# Patient Record
Sex: FEMALE | Race: OTHER | Employment: UNEMPLOYED | URBAN - METROPOLITAN AREA
[De-identification: names, ages, dates, MRNs, and addresses within clinical notes are randomized per-mention and may not be internally consistent; named-entity substitution may affect disease eponyms.]

---
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
## 2021-01-01 ENCOUNTER — DOCUMENTATION ONLY (OUTPATIENT)
Dept: FAMILY MEDICINE CLINIC | Age: 21
End: 2021-01-01

## 2021-01-02 NOTE — PROGRESS NOTES
Patient is currently receiving her Albuterol 90mcg and Flovent 110mcg inhalers from the [facility]. Both medications are also available through PAP. On 13-, a [facility] PAP application in Irish was mailed to the patient's home. A postage paid, addressed envelope for her to return the application to me was included in the mailing.  Marija Norwood RN

## 2021-01-11 ENCOUNTER — TELEPHONE (OUTPATIENT)
Dept: FAMILY MEDICINE CLINIC | Age: 21
End: 2021-01-11

## 2021-01-12 ENCOUNTER — OFFICE VISIT (OUTPATIENT)
Dept: FAMILY MEDICINE CLINIC | Age: 21
End: 2021-01-12

## 2021-01-12 DIAGNOSIS — F43.23 ADJUSTMENT DISORDER WITH MIXED ANXIETY AND DEPRESSED MOOD: Primary | ICD-10-CM

## 2021-01-12 PROCEDURE — 90791 PSYCH DIAGNOSTIC EVALUATION: CPT | Performed by: SOCIAL WORKER

## 2021-01-12 NOTE — PROGRESS NOTES
This note will not be viewable in [facility] for the following reason(s). This is a Psychotherapy Note. ([address], [facility] [address] Providers Only)      INITIAL ASSESSMENT  Consent: Tara Melgar, who was seen by synchronous (real-time) audio-video technology, and/or patients healthcare decision maker, is aware that this patient-initiated, Telehealth encounter on 1-12-21 is a billable service, with coverage as determined by the insurance carrier. Patient is aware that he/she may receive a bill and has provided verbal consent to proceed. Pursuant to the emergency declaration under the [facility] waiver authority and the [employer] and [employer] Act, this Virtual Visit was conducted, with patient's (and/or legal guardian's) consent, to reduce the patient's risk of exposure to COVID-19 and provide necessary medical care. Services were provided through a video synchronous discussion virtually to substitute for in-person clinic visit. Patient and provider were located at their individual homes. Referred by mother, Alma Antwon, to this clinician for possible post-partum depression. Edd Kelly is 24years old with a three month old son, . The pregnancy and L&D were normal and the baby appeared to be healthy and developing typically. The father is minimally involved. Edd Kelly reports 'I'm always fighting with him.' Jayshree Royal with mom and baby; mom works. Edd Kelly is working towards her high school degree. She came to the [address] [place] [address],[address] from [facility] for approximately 4 years. Denies any trauma on the trip here. She reports feeling very tired, emotional and worried about money as her mother is working two jobs now to help support the baby. Her scores on questionnaires for anxiety, depression and psychosomatic complaints were below baseline for depression and psychosomatic symptoms and slightly elevated for anxiety.   She was frequently tearful in the session when talking about her aunt and uncle who raised her dying in [facility] three years ago and the effort it takes to care for her baby. In the questionnaires she indicated increased irritation, but was not positive for suicide or harming herself or the baby. When mom is home, she helps Briana Syed with the baby, as well. Briana Syed was positive when talking about having had a baby and did not express any regrets. She held the baby and fed the baby for most of the session and seemed bonded with him. She is not receiving any in-home services at this time. Briana Syed has minimal opportunity for leaving the house. She is afraid of taking the baby outside as she did so once and he got sick later. She reports not having motivation to go out for a walk. She does not have any friends now that school is virtual.  She has not community involvement. She denied any difficulty sleeping (baby is sleeping through the night for the most part) or with appetite. She denied any use of drugs or alcohol. She is on PRN medication for allergies and asthma. Clinician provided opportunity for emotional expression. Clinician facilitated exploration of how her life is different now with Sebas and began to explore how she feels about the changes. She was tearful for most of this discussion. Clinician explored some things she might do for self with and without baby. Clinician suggested she talk to pediatrician at next visit related to taking the baby outside. She agreed. Follow up session in 2 weeks virtual and in 4 weeks in person.

## 2021-01-28 ENCOUNTER — VIRTUAL VISIT (OUTPATIENT)
Dept: FAMILY MEDICINE CLINIC | Age: 21
End: 2021-01-28

## 2021-01-28 DIAGNOSIS — F43.23 ADJUSTMENT DISORDER WITH MIXED ANXIETY AND DEPRESSED MOOD: Primary | ICD-10-CM

## 2021-01-28 PROCEDURE — 90832 PSYTX W PT 30 MINUTES: CPT | Performed by: SOCIAL WORKER

## 2021-01-28 NOTE — PROGRESS NOTES
This note will not be viewable in [place] for the following reason(s). This is a Psychotherapy Note. ([address], [facility] [address] Providers Only)    Consent: Andreia Malcolm, who was seen by synchronous (real-time) audio-video technology, and/or patients healthcare decision maker, is aware that this patient-initiated, Telehealth encounter on 1-28-21 is a billable service, with coverage as determined by the insurance carrier. Patient is aware that he/she may receive a bill and has provided verbal consent to proceed. Pursuant to the emergency declaration under the [address] [place] [address], [facility] waiver authority and the [employer] and [employer] Act, this Virtual Visit was conducted, with patient's (and/or legal guardian's) consent, to reduce the patient's risk of exposure to COVID-19 and provide necessary medical care. Services were provided through a video synchronous discussion virtually to substitute for in-person clinic visit. Patient and provider were located at their individual homes. FOCUS: Follow up from initial session. Continue building trust and rapport. Clinician facilitated exploration into significant change in life plan she is experiencing and how she is feeling about having a baby and not living the 'typical' life of a 24year old. She acknowledges not having planned this for her life. Recognizes being isolated from others her age. Clinician explored resulting feelings. Clinician also processed how she is managing any frustration she feels with current lifestyle and with baby. Patient able to identify several healthy coping skills she uses to handle frustration and irritation. Baby is sleeping through the night for the most part, so she is not sleep deprived. Reports eating and sleeping well. Still working on school work.  Clinician provided psycho-education on self care and how this relates to positive maternal involvement with baby.  Clinician encouraged her to read to baby and to ask pediatrician for guides as to how to support baby's overall development. ASSESSMENT:  Clinician observed patient as having good coping skills and healthy focus on baby and on self. Has started going out more with the baby and able to verbalize feeling better because of this. Clinician encouraged her to do this as much as possible, with and without baby. Clinician observed more relaxed affect in patient and she agreed that maintaining therapeutic relationship will help her manage stressors that may come later. PLAN: Follow up in two weeks in clinic. Patient is to come to that visit with an idea of what she would like for therapeutic plan for next three months.

## 2021-01-29 ENCOUNTER — CLINICAL SUPPORT (OUTPATIENT)
Dept: OBGYN CLINIC | Age: 21
End: 2021-01-29

## 2021-01-29 VITALS
HEART RATE: 86 BPM | BODY MASS INDEX: 24.51 KG/M2 | SYSTOLIC BLOOD PRESSURE: 124 MMHG | WEIGHT: 129.8 LBS | HEIGHT: 61 IN | DIASTOLIC BLOOD PRESSURE: 58 MMHG

## 2021-01-29 DIAGNOSIS — Z30.42 ENCOUNTER FOR MANAGEMENT AND INJECTION OF DEPO-PROVERA: Primary | ICD-10-CM

## 2021-01-29 PROCEDURE — 96372 THER/PROPH/DIAG INJ SC/IM: CPT | Performed by: OBSTETRICS & GYNECOLOGY

## 2021-01-29 RX ORDER — MEDROXYPROGESTERONE ACETATE 150 MG/ML
150 INJECTION, SUSPENSION INTRAMUSCULAR ONCE
Status: COMPLETED | OUTPATIENT
Start: 2021-01-29 | End: 2021-01-29

## 2021-01-29 RX ADMIN — MEDROXYPROGESTERONE ACETATE 150 MG: 150 INJECTION, SUSPENSION INTRAMUSCULAR at 15:35

## 2021-01-29 NOTE — PROGRESS NOTES
Last Depo-Provera injection: 11/13/2020  Side Effects if any: none  Serum HCG indicated? Depo-Provera 150 mg IM given by: Estephanie Azar in gluteus ( right). Next Depo-Provera injection due: 4/16-4/30/21    Administered 150mg/mL Depo-Provera per orders of Dat Bustillos MD . Verbal consent received by Ms. Darryn Page & injection given. Patient tolerated well, no complications and no side effects. Encouraged her to remain in clinic for 15 minutes and immediately report any adverse reactions. Patient informed of next injection date ranges and encouraged to schedule. She verbalized understanding and expressed intent to comply.

## 2021-02-09 ENCOUNTER — OFFICE VISIT (OUTPATIENT)
Dept: FAMILY MEDICINE CLINIC | Age: 21
End: 2021-02-09

## 2021-02-09 DIAGNOSIS — F43.23 ADJUSTMENT DISORDER WITH MIXED ANXIETY AND DEPRESSED MOOD: Primary | ICD-10-CM

## 2021-02-09 DIAGNOSIS — Z63.8 FAMILY DISCORD: ICD-10-CM

## 2021-02-09 PROCEDURE — 90837 PSYTX W PT 60 MINUTES: CPT | Performed by: SOCIAL WORKER

## 2021-02-09 SDOH — SOCIAL STABILITY - SOCIAL INSECURITY: OTHER SPECIFIED PROBLEMS RELATED TO PRIMARY SUPPORT GROUP: Z63.8

## 2021-02-09 NOTE — PROGRESS NOTES
Chilo Parker presented in session as alert and oriented and well-groomed  She was particularly tearful when talking about how angry her mother has been lately seemingly with everything Chilo Parker does. Chilo Parker reports that mom gives conflicting information. Client's affect was sad and overwhelmed. Clinician provided opportunity for emotional expression. Clinician also explored and processed with patient how she is absorbing and taking responsibility for a lot of mom's feelings. Chilo Parker recognizes that mom seems mad at her for ruining her life by getting pregnant. Clinician processed how Chilo Parker feels about getting pregnant and having a baby at 24. Chilo Parker denies feeling embarrassed or that she did something wrong. She recognizes how she has altered her life, but continues to verbalize a focus on graduating high school and getting a good job. Chilo Parker also reported that she and the father of her baby have reconciled and that he is now living with them in her mother's house. He is working and helping with the baby and some of the bills. Chilo Parker may look for a job, as well, because her mother is accusing her of not helping with the bills. Mom has also strongly encouraged Chilo Parker to make her education her priority. She takes classes at night, so could work during the day. However, her mother also said that if she gets a job, she will charge her for watching the baby. Clinician provided psycho-education on how to maintain boundaries with other's emotions and to be aware of the level of her anger to as to manage it sooner before losing her temper. We will have a virtual appointment in two weeks.

## 2021-02-23 ENCOUNTER — OFFICE VISIT (OUTPATIENT)
Dept: FAMILY MEDICINE CLINIC | Age: 21
End: 2021-02-23

## 2021-02-23 DIAGNOSIS — F43.23 ADJUSTMENT DISORDER WITH MIXED ANXIETY AND DEPRESSED MOOD: Primary | ICD-10-CM

## 2021-02-23 DIAGNOSIS — Z63.8 FAMILY DISCORD: ICD-10-CM

## 2021-02-23 PROCEDURE — 90832 PSYTX W PT 30 MINUTES: CPT | Performed by: SOCIAL WORKER

## 2021-02-23 SDOH — SOCIAL STABILITY - SOCIAL INSECURITY: OTHER SPECIFIED PROBLEMS RELATED TO PRIMARY SUPPORT GROUP: Z63.8

## 2021-02-23 NOTE — PROGRESS NOTES
Came in for F2F appointment at [address] [employer]. Affect and mood congruent and appeared much improved from last session. Abi Haskins reports that relationships with mom and between mom and the father of her baby, who is living with them, have improved as a result of mom and boyfriend talking about finances. Abi Haskins reports decrease in symptoms that brought her into counseling in first place, but she is also willing to continue. She continues to attend classes. Mom has changed her mind about her having to work and is again supportive of her graduating in June. This clinician facilitated exploration with Abi Haskins about ways of managing other people's anger and emotions and how to prevent their emotion to change her mood as much as it did in last session. Clinician provided psycho-education on how mom's emotional state can affect the baby. Edith expressed understanding and agreement. Clinician also discussed normal stages of development for baby and encouraged Abi Haskins to allow her baby to play on the floor in order to develop muscles he will need to crawl and then walk. She denied any issues with baby and has established a routine with him of sleep, feeding, and playing. Abi Haskins was in agreement that to continue sessions was important, but she did not feel she needed to continue to come in every week. A follow up F2F appointment was made for 5 weeks from today and she was encouraged to call in between now and then if things worsened. She agreed.

## 2021-04-06 ENCOUNTER — OFFICE VISIT (OUTPATIENT)
Dept: FAMILY MEDICINE CLINIC | Age: 21
End: 2021-04-06

## 2021-04-06 DIAGNOSIS — F43.23 ADJUSTMENT DISORDER WITH MIXED ANXIETY AND DEPRESSED MOOD: Primary | ICD-10-CM

## 2021-04-06 PROCEDURE — 90832 PSYTX W PT 30 MINUTES: CPT | Performed by: SOCIAL WORKER

## 2021-04-06 NOTE — PROGRESS NOTES
Seen in clinic. Continuing to do well. No issues with mom or with father of her son who continues to live with them. Baby developing normally. Is 7 months old and weights 24 pounds. Is starting to pull up and to say some words. Edith's affect bright when talking about him. She continues in school and will graduate in June. In session she makes eye contact and is engaged. Reports health of everyone good and no concerns with food or housing. Mom and boyfriend continue to work full time to allow her to focus on baby and school. Denies any current emotional issues. She requested follow up session for three months out and agreed to call if things continue to improve.

## 2021-04-20 ENCOUNTER — OFFICE VISIT (OUTPATIENT)
Dept: OBGYN CLINIC | Age: 21
End: 2021-04-20

## 2021-04-20 DIAGNOSIS — N76.0 VAGINITIS AND VULVOVAGINITIS: Primary | ICD-10-CM

## 2021-04-20 DIAGNOSIS — Z30.42 ENCOUNTER FOR DEPO-PROVERA CONTRACEPTION: ICD-10-CM

## 2021-04-20 PROCEDURE — 96372 THER/PROPH/DIAG INJ SC/IM: CPT | Performed by: OBSTETRICS & GYNECOLOGY

## 2021-04-20 PROCEDURE — 99213 OFFICE O/P EST LOW 20 MIN: CPT | Performed by: OBSTETRICS & GYNECOLOGY

## 2021-04-20 RX ORDER — MEDROXYPROGESTERONE ACETATE 150 MG/ML
150 INJECTION, SUSPENSION INTRAMUSCULAR ONCE
Status: COMPLETED | OUTPATIENT
Start: 2021-04-20 | End: 2021-04-20

## 2021-04-20 RX ADMIN — MEDROXYPROGESTERONE ACETATE 150 MG: 150 INJECTION, SUSPENSION INTRAMUSCULAR at 11:23

## 2021-04-20 NOTE — PROGRESS NOTES
Date last pap: NA due to age [de-identified] AE 11/11/2020  Last Depo-Provera: 1/29/21  Side Effects if any: none  Serum HCG indicated? Not indicated  Depo-Provera 150 mg IM given by: Denisse Kim  Next appointment due 7/6/21-7/20/21    Administered 150mg/mL Depo-Provera per MD order. Verbal consent given by patient. Injection given IM in the right gluteus . Patient tolerated well, no complications, no side effects. Lot # [id number]  Exp: 9/2022    Advised patient dates for next depo injection.  Patient verbalized understanding

## 2021-04-23 LAB
A VAGINAE DNA VAG QL NAA+PROBE: ABNORMAL SCORE
BVAB2 DNA VAG QL NAA+PROBE: ABNORMAL SCORE
C ALBICANS DNA VAG QL NAA+PROBE: NEGATIVE
C GLABRATA DNA VAG QL NAA+PROBE: NEGATIVE
C TRACH DNA VAG QL NAA+PROBE: POSITIVE
MEGA1 DNA VAG QL NAA+PROBE: ABNORMAL SCORE
N GONORRHOEA DNA VAG QL NAA+PROBE: NEGATIVE
T VAGINALIS DNA VAG QL NAA+PROBE: NEGATIVE

## 2021-04-23 NOTE — PROGRESS NOTES
Notify BV and trich--Rx Flagyl 500 bid for 7 days, one refill  Don't have sex with partner until he is treated also.

## 2021-04-27 RX ORDER — METRONIDAZOLE 500 MG/1
500 TABLET ORAL 2 TIMES DAILY
Qty: 14 TAB | Refills: 1 | Status: SHIPPED | OUTPATIENT
Start: 2021-04-27 | End: 2021-05-04

## 2021-04-27 RX ORDER — AZITHROMYCIN 500 MG/1
1000 TABLET, FILM COATED ORAL
Qty: 2 TAB | Refills: 1 | Status: SHIPPED | OUTPATIENT
Start: 2021-04-27

## 2021-04-27 NOTE — PROGRESS NOTES
used #[id number] to contact patient. RX sent for Flagyl 500mg bid for 7 days and Zithromax 1g at delivery    Left voice message for pt to call back.

## 2021-06-19 NOTE — L&D DELIVERY NOTE
Delivery Summary    Patient: Jean Carlos Nelson MRN: [id number]  SSN: [id number]    YOB: 2000  Age: 24 y.o. Sex: female       Information for the patient's :  Kike Zhou [[id number]]       Labor Events:    Labor: No    Steroids: None   Cervical Ripening Date/Time: 2020     Cervical Ripening Type: Langston/EASI   Antibiotics During Labor:     Rupture Identifier:      Rupture Date/Time: 2020 6:27 AM   Rupture Type: SROM   Amniotic Fluid Volume: Moderate    Amniotic Fluid Description: Meconium    Amniotic Fluid Odor: None    Induction: Langston Bulb (balloon)       Induction Date/Time: 2020      Indications for Induction: Elective    Augmentation: Oxytocin   Augmentation Date/Time:      Indications for Augmentation: Chorioamnionitis   Labor complications: Chorioamnionitis       Additional complications:        Delivery Events:  Indications For Episiotomy:     Episiotomy:     Perineal Laceration(s):     Repaired:     Periurethral Laceration Location:      Repaired:     Labial Laceration Location:     Repaired:     Sulcal Laceration Location: right   Repaired: Yes   Vaginal Laceration Location: left   Repaired: Yes   Cervical Laceration Location:     Repaired:     Repair Suture: Chromic 2-0   Number of Repair Packets: 1   Estimated Blood Loss (ml):  see QBL   Quantitative Blood Loss (ml)                Delivery Date: 2020    Delivery Time: 4:59 PM  Delivery Type: Vaginal, Spontaneous  Sex:  Male    Gestational Age: 41w4d   Delivery Clinician:  Tiff Lombardi  Living Status: Living   Delivery Location: L&D           APGARS  One minute Five minutes Ten minutes   Skin color: 1   1        Heart rate: 2   2        Grimace: 2   2        Muscle tone: 2   2        Breathin   2        Totals: 9   9            Presentation: Vertex    Position: Left Occiput Anterior  Resuscitation Method:  Suctioning-bulb; Tactile Stimulation     Meconium Stained:  Thin Cord Information: 3 Vessels  Complications: None  Cord around:    Delayed cord clamping? Yes  Cord clamped date/time:2020  5:00 PM  Disposition of Cord Blood: Lab    Blood Gases Sent?: No    Placenta:  Date/Time: 2020  5:02 PM  Removal: Expressed      Appearance: Normal;Intact//Intact/Uterus explored and negative      Measurements:  Birth Weight:        Birth Length:        Head Circumference:        Chest Circumference:       Abdominal Girth: Other Providers:   NERY Almeida;Trent SAAB, Obstetrician;Primary Nurse;Primary  Nurse           Group B Strep:   Lab Results   Component Value Date/Time    GrBStrep, External Negative 2020     Information for the patient's :  Daphne Suarez, Male Gurwinderhussain Menendezceci [[id number]]   No results found for: ABORH, PCTABR, PCTDIG, BILI, ABORHEXT, ABORH     No results for input(s): PCO2CB, PO2CB, HCO3I, SO2I, IBD, PTEMPI, SPECTI, PHICB, ISITE, IDEV, IALLEN in the last 72 hours. 0

## 2021-07-20 ENCOUNTER — OFFICE VISIT (OUTPATIENT)
Dept: FAMILY MEDICINE CLINIC | Age: 21
End: 2021-07-20

## 2021-07-20 DIAGNOSIS — F33.1 MAJOR DEPRESSIVE DISORDER, RECURRENT EPISODE, MODERATE (HCC): Primary | ICD-10-CM

## 2021-07-20 DIAGNOSIS — Z63.8 FAMILY DISCORD: ICD-10-CM

## 2021-07-20 PROCEDURE — 90837 PSYTX W PT 60 MINUTES: CPT | Performed by: SOCIAL WORKER

## 2021-07-20 SDOH — SOCIAL STABILITY - SOCIAL INSECURITY: OTHER SPECIFIED PROBLEMS RELATED TO PRIMARY SUPPORT GROUP: Z63.8

## 2021-07-22 NOTE — PROGRESS NOTES
Psychotherapy Progress Note  Functional Status/MSWE: WNL  Safety Concerns: none at this time  Psych Meds: See chart  Diagnosis: See chart  Necessity of treatment due to:   ADHD Symptoms  Psychiatric Meds   X Anxiety  OCD Symptoms    Appetite X Regression Risk    Cognitive Impairment X Sleep   X Depression  Social    Harm (to others)  Substance Abuse    Harm (to self)  Thought Disorder    Medical: X Other: Family discord   Modalities Used:  X Cognitive Challenging X Exploration of Relationship Patterns  Psychoeducation   X Cognitive Refocusing X Facilitate Insight X Relaxation Techniques    Cognitive Reframing  EMDR  Review of Tx Plan/Progress    Crisis Intervention X Grief Processing  Reflect Patterns and Defenses   X Communication Skills  Guided Imagery (GIM-[place] Method)    Role Play/behavioral Rehearsal    DBT  Interactive Feedback   Structured Problem Solving   X Explore Behavior  Interpersonal Resolutions  Supportive Reflection   X Explore Feelings/Issues  Instilling Hope  Symptom Management   X Explore Negative Self Talk  Mindfulness Training  Other:   X Exploration of Coping Patterns X Provide Opportunity for Emotional Expression     Summary of Session: Migue Schulte reports feeling overwhelmed due to vitriolic nature of her interactions with her mother, with whom she and boyfriend and [de-identified] old baby live. Migue Schulte expresses feeling pulled between boyfriend and mother and she relies on both of them significantly. She is currently looking for a job, but reports that mother has been ambivalent about caring for baby if they decide they are moving out. Migue Schulte recognizes that moving out is the ideal solution, but would need mom to babysit and mom is ambivalent in her support at this time. Progress/Clinical Assessment:  This clinician also sees patient's mother from time to time and there are significant differences in perceptions of mother and daughter as to the behavior of the other and differences in how they are reporting information. Daughter and mother both have demonstrated hesitation in coming in for a family session. Per Edith's report, mom withholds potential of babysitting the grandchild if daughter moves out with boyfriend. Daughter feels that mom is manipulating her into continuing to live with her and help with expenses. Daughter is also aware that mom is angry at her for getting pregnant and angry at boyfriend. Mom often attempts to disrupt their relationship by accusing boyfriend of using drugs, not finding a job, not paying rent, etc. Mom also listens to their disagrements and then uses the information against Edith and against the boyfriend. Treatment Goals Addressed this Session: feelings of depression and anxiety; maintaining health and safety of baby primary; improving communication with mom, but also knowing when to walk away. Plan/ Frequency of Treatment: Sessions every three weeks; will continue to address possible solutions to having family sessions.

## 2021-08-24 ENCOUNTER — DOCUMENTATION ONLY (OUTPATIENT)
Dept: FAMILY MEDICINE CLINIC | Age: 21
End: 2021-08-24

## 2021-08-24 NOTE — PROGRESS NOTES
No show. However, site was changed, so she may not have gotten the voice mail left for her. She will be offered an opportunity to re-schedule.

## 2021-09-08 ENCOUNTER — TELEPHONE (OUTPATIENT)
Dept: FAMILY MEDICINE CLINIC | Age: 21
End: 2021-09-08

## 2021-09-08 NOTE — TELEPHONE ENCOUNTER
P/C to Ambrosio Huynh to reschedule the no show behavioral health appointment from 08.24.21. The appointment was rescheduled for 09.14.21 at 2:00PM. I reminded the patient of the importance to contact the office at [phone] if she needs to cancel/reschedule the appointment. Patient verbalized understanding and has no further changes.     P/C routed to [facility], [place] [address] Kev Harrell

## 2021-09-14 ENCOUNTER — DOCUMENTATION ONLY (OUTPATIENT)
Dept: FAMILY MEDICINE CLINIC | Age: 21
End: 2021-09-14

## 2021-09-14 NOTE — PROGRESS NOTES
2nd no show. Did confirm appointment. Will not be rescheduled.  If she calls to make appointment, she can be placed on waiting list.

## 2022-03-19 PROBLEM — Z34.81 ENCOUNTER FOR SUPERVISION OF OTHER NORMAL PREGNANCY, FIRST TRIMESTER: Status: ACTIVE | Noted: 2020-09-16

## 2022-03-19 PROBLEM — Z34.01 SUPERVISION OF NORMAL FIRST TEEN PREGNANCY, FIRST TRIMESTER: Status: ACTIVE | Noted: 2020-02-06

## 2022-03-19 PROBLEM — Z34.90 PREGNANCY: Status: ACTIVE | Noted: 2020-09-17

## 2023-01-17 ENCOUNTER — HOSPITAL ENCOUNTER (OUTPATIENT)
Dept: LAB | Age: 23
Discharge: HOME | End: 2023-01-17

## 2023-01-17 ENCOUNTER — OFFICE VISIT (OUTPATIENT)
Dept: FAMILY MEDICINE CLINIC | Age: 23
End: 2023-01-17

## 2023-01-17 VITALS
TEMPERATURE: 98.1 F | OXYGEN SATURATION: 100 % | DIASTOLIC BLOOD PRESSURE: 70 MMHG | WEIGHT: 158 LBS | HEART RATE: 81 BPM | SYSTOLIC BLOOD PRESSURE: 119 MMHG | BODY MASS INDEX: 29.85 KG/M2

## 2023-01-17 DIAGNOSIS — Z01.419 ENCOUNTER FOR WELL WOMAN EXAM: ICD-10-CM

## 2023-01-17 DIAGNOSIS — Z23 ENCOUNTER FOR IMMUNIZATION: ICD-10-CM

## 2023-01-17 DIAGNOSIS — Z11.3 ROUTINE SCREENING FOR STI (SEXUALLY TRANSMITTED INFECTION): ICD-10-CM

## 2023-01-17 DIAGNOSIS — Z01.419 ENCOUNTER FOR WELL WOMAN EXAM: Primary | ICD-10-CM

## 2023-01-17 PROCEDURE — 88142 CYTOPATH C/V THIN LAYER: CPT

## 2023-01-17 PROCEDURE — 99203 OFFICE O/P NEW LOW 30 MIN: CPT | Performed by: PHYSICIAN ASSISTANT

## 2023-01-17 PROCEDURE — 90686 IIV4 VACC NO PRSV 0.5 ML IM: CPT

## 2023-01-17 PROCEDURE — 90471 IMMUNIZATION ADMIN: CPT

## 2023-01-17 PROCEDURE — 87491 CHLMYD TRACH DNA AMP PROBE: CPT

## 2023-01-17 NOTE — PROGRESS NOTES
Name and  verified. Flu vaccine administered per patient request after obtaining consent, confirming VIS understanding, and ruling out contraindications. Patient instructed about signs of allergic reactions and when to seek emergency care. Patient verbalized understanding. Tolerated vaccination well, no adverse effects noted. Recorded in EMR and VIIS. Patient instructed to stay in waiting area for 15 minute for observation. Patient discharged in stable condition after 15 minute waiting period. Patient given an opportunity to verbalize questions/concerns. No questions at this time. [facility] interpretor #[id number] assisted.

## 2023-01-17 NOTE — PROGRESS NOTES
Maya Oreilly seen at d/c, full name and  verified. After Visit Summary provided and reviewed along with discharge instructions. Provided patient with family planning resources. Opportunity for questions provided, patient verbalizes understanding.

## 2023-01-17 NOTE — PROGRESS NOTES
Assessment/Plan:    Diagnoses and all orders for this visit:    1. Encounter for well woman exam  -     PAP, LIQUID BASED, MANUAL SCREEN; Future    2. Routine screening for STI (sexually transmitted infection)  -     CT/NG/T.VAGINALIS AMPLIFICATION; Future        Follow-up and Dispositions    Return in about 1 year (around 2024), or if symptoms worsen or fail to improve. JOSEFINA Renteria expressed understanding of this plan. An AVS was printed and given to the patient.      ----------------------------------------------------------------------    Chief Complaint   Patient presents with    Well Woman       History of Present Illness:    New pt 24 yo presents for well woman exam and STI screening   4 yo   She is on monthly depo through [facility]   She is in a relationship with the FOB, this has been off/on. She would like STI testing bc they just recently reunited  She denies risk of DV  She is not having any pain with intercourse or any vaginal discharge  She is interested in getting referred for family planning     Past Medical History:   Diagnosis Date    Asthma     Chlamydia 21, 2020    Eczema     History of chicken pox     at age 3    HPV vaccine counseling     3/3 Gardasil received       Current Outpatient Medications   Medication Sig Dispense Refill    azithromycin (ZITHROMAX) 500 mg tab Take 2 Tabs by mouth Once at Delivery for 1 dose. 2 Tab 1    medroxyPROGESTERone (DEPO-PROVERA) 150 mg/mL syrg INJECT 1ML INTO THE MUSCLE ONCE FOR A ONE TIME DOSE      albuterol (PROVENTIL HFA, VENTOLIN HFA, PROAIR HFA) 90 mcg/actuation inhaler Take 2 Puffs by inhalation every four (4) hours as needed for Wheezing or Shortness of Breath. 2 Inhaler 3    fluticasone propionate (FLOVENT HFA) 110 mcg/actuation inhaler Take 2 Puffs by inhalation every twelve (12) hours.  2 Inhaler 6    triamcinolone acetonide (KENALOG) 0.1 % topical cream Apply  to affected area two (2) times a day. use thin layer legs x 3 weeks 454 g 2    loratadine (CLARITIN) 10 mg tablet Take 1 Tab by mouth daily. 90 Tab 3       No Known Allergies    Social History     Tobacco Use    Smoking status: Never    Smokeless tobacco: Never   Substance Use Topics    Alcohol use: No    Drug use: No       No family history on file. Physical Exam:     Visit Vitals  /70   Pulse 81   Temp 98.1 °F (36.7 °C) (Temporal)   Wt 158 lb (71.7 kg)   LMP 12/28/2022   SpO2 100%   BMI 29.85 kg/m²       A&Ox3  WDWN NAD  Respirations normal and non labored  Pelvic exam- ext neg for lesion or discharge. Cervix and vagina w/out lesion or discharge.  Uterus and adnexal exam neg for mass or tenderness

## 2023-01-17 NOTE — PROGRESS NOTES
[facility]    When was your last pap smear and where? 2020    Any abnormal results from previous pap smears? no    Any problems with your breasts? no    Any family history of breast/ovarian cancer? no    Do you have any kids? yes    Oldest  youngest 3years old    Are you sexually active? si    Are you using any type of birth control? If yes where do you go for this? [facility]    Abuse screening completed this visit?  yes

## 2023-01-19 NOTE — PROGRESS NOTES
Please send letter: Abdirahman Mesa results of your pap test are neg/normal. Your next pap will be in 3 years\".  Thank you

## 2023-01-20 LAB
C TRACH RRNA SPEC QL NAA+PROBE: NEGATIVE
N GONORRHOEA RRNA SPEC QL NAA+PROBE: NEGATIVE
SPECIMEN SOURCE: NORMAL

## 2023-01-20 NOTE — PROGRESS NOTES
A letter with provider comments/recommendations, \"the results of your pap test are neg/normal. Your next pap will be in 3 years,\" was drafted and sent to queue.

## 2023-05-22 RX ORDER — TRIAMCINOLONE ACETONIDE 1 MG/G
CREAM TOPICAL 2 TIMES DAILY
COMMUNITY
Start: 2019-10-24

## 2023-05-22 RX ORDER — MEDROXYPROGESTERONE ACETATE 150 MG/ML
INJECTION, SUSPENSION INTRAMUSCULAR
COMMUNITY
Start: 2020-11-12

## 2023-05-22 RX ORDER — AZITHROMYCIN 500 MG/1
1000 TABLET, FILM COATED ORAL
COMMUNITY
Start: 2021-04-27

## 2023-05-22 RX ORDER — FLUTICASONE PROPIONATE 110 UG/1
2 AEROSOL, METERED RESPIRATORY (INHALATION) EVERY 12 HOURS
COMMUNITY
Start: 2020-04-02

## 2023-05-22 RX ORDER — LORATADINE 10 MG/1
10 TABLET ORAL DAILY
COMMUNITY
Start: 2016-09-22

## 2023-05-22 RX ORDER — ALBUTEROL SULFATE 90 UG/1
2 AEROSOL, METERED RESPIRATORY (INHALATION) EVERY 4 HOURS PRN
COMMUNITY
Start: 2020-04-02

## 2023-11-10 NOTE — TELEPHONE ENCOUNTER
Nurse telephoned patient to let her know that documents were faxed to [facility] pharmacy and that she would receive a phone call message from [facility] when her medication was ready to . If patient does not receive phone call in 5 business days of today 3/1/19 patient to go in person to the [facility] [address] [place] [id number] and ask for the medication at the medication window. If she encounter a problem after the 5 business days she needs to call our office to let us know. -send message to Cindy Marlow RN      If patient or parent/legal guardian call please provide the above information thanks. n/a

## 2024-09-15 ENCOUNTER — APPOINTMENT (OUTPATIENT)
Facility: HOSPITAL | Age: 24
End: 2024-09-15
Payer: COMMERCIAL

## 2024-09-15 ENCOUNTER — HOSPITAL ENCOUNTER (EMERGENCY)
Facility: HOSPITAL | Age: 24
Discharge: HOME | End: 2024-09-15
Attending: EMERGENCY MEDICINE
Payer: COMMERCIAL

## 2024-09-15 VITALS
SYSTOLIC BLOOD PRESSURE: 133 MMHG | OXYGEN SATURATION: 100 % | TEMPERATURE: 98.2 F | WEIGHT: 160.05 LBS | RESPIRATION RATE: 18 BRPM | HEART RATE: 85 BPM | DIASTOLIC BLOOD PRESSURE: 78 MMHG

## 2024-09-15 DIAGNOSIS — S99.922A INJURY OF TOE ON LEFT FOOT, INITIAL ENCOUNTER: Primary | ICD-10-CM

## 2024-09-15 DIAGNOSIS — S91.215A LACERATION OF LESSER TOE OF LEFT FOOT WITHOUT FOREIGN BODY WITH DAMAGE TO NAIL, INITIAL ENCOUNTER: ICD-10-CM

## 2024-09-15 PROCEDURE — 90714 TD VACC NO PRESV 7 YRS+ IM: CPT

## 2024-09-15 PROCEDURE — 6360000002 HC RX W HCPCS

## 2024-09-15 PROCEDURE — 2500000003 HC RX 250 WO HCPCS

## 2024-09-15 PROCEDURE — 6370000000 HC RX 637 (ALT 250 FOR IP)

## 2024-09-15 PROCEDURE — 99284 EMERGENCY DEPT VISIT MOD MDM: CPT

## 2024-09-15 PROCEDURE — 90471 IMMUNIZATION ADMIN: CPT

## 2024-09-15 PROCEDURE — 73660 X-RAY EXAM OF TOE(S): CPT

## 2024-09-15 RX ORDER — IBUPROFEN 400 MG/1
600 TABLET, FILM COATED ORAL
Status: COMPLETED | OUTPATIENT
Start: 2024-09-15 | End: 2024-09-15

## 2024-09-15 RX ORDER — GINSENG 100 MG
CAPSULE ORAL
Qty: 14.2 G | Refills: 0 | Status: SHIPPED | OUTPATIENT
Start: 2024-09-15

## 2024-09-15 RX ORDER — GINSENG 100 MG
CAPSULE ORAL
Status: COMPLETED | OUTPATIENT
Start: 2024-09-15 | End: 2024-09-15

## 2024-09-15 RX ORDER — LIDOCAINE HYDROCHLORIDE 10 MG/ML
5 INJECTION, SOLUTION EPIDURAL; INFILTRATION; INTRACAUDAL; PERINEURAL
Status: COMPLETED | OUTPATIENT
Start: 2024-09-15 | End: 2024-09-15

## 2024-09-15 RX ORDER — CEPHALEXIN 500 MG/1
500 CAPSULE ORAL 3 TIMES DAILY
Qty: 21 CAPSULE | Refills: 0 | Status: SHIPPED | OUTPATIENT
Start: 2024-09-15 | End: 2024-09-22

## 2024-09-15 RX ADMIN — CLOSTRIDIUM TETANI TOXOID ANTIGEN (FORMALDEHYDE INACTIVATED) AND CORYNEBACTERIUM DIPHTHERIAE TOXOID ANTIGEN (FORMALDEHYDE INACTIVATED) 0.5 ML: 5; 2 INJECTION, SUSPENSION INTRAMUSCULAR at 19:29

## 2024-09-15 RX ADMIN — IBUPROFEN 600 MG: 400 TABLET, FILM COATED ORAL at 19:09

## 2024-09-15 RX ADMIN — LIDOCAINE HYDROCHLORIDE 5 ML: 10 INJECTION, SOLUTION EPIDURAL; INFILTRATION; INTRACAUDAL; PERINEURAL at 20:18

## 2024-09-15 RX ADMIN — BACITRACIN: 500 OINTMENT TOPICAL at 20:17

## 2024-09-15 ASSESSMENT — PAIN - FUNCTIONAL ASSESSMENT
PAIN_FUNCTIONAL_ASSESSMENT: ACTIVITIES ARE NOT PREVENTED
PAIN_FUNCTIONAL_ASSESSMENT: 0-10

## 2024-09-15 ASSESSMENT — PAIN DESCRIPTION - LOCATION: LOCATION: TOE (COMMENT WHICH ONE)

## 2024-09-15 ASSESSMENT — PAIN DESCRIPTION - ORIENTATION: ORIENTATION: LEFT

## 2024-09-15 ASSESSMENT — PAIN SCALES - GENERAL: PAINLEVEL_OUTOF10: 8

## 2024-09-15 ASSESSMENT — PAIN DESCRIPTION - DESCRIPTORS: DESCRIPTORS: ACHING

## 2024-09-15 ASSESSMENT — PAIN DESCRIPTION - FREQUENCY: FREQUENCY: CONTINUOUS

## 2024-09-15 ASSESSMENT — PAIN DESCRIPTION - PAIN TYPE: TYPE: ACUTE PAIN

## 2024-09-15 ASSESSMENT — PAIN DESCRIPTION - ONSET: ONSET: ON-GOING

## 2024-09-24 ENCOUNTER — APPOINTMENT (OUTPATIENT)
Facility: HOSPITAL | Age: 24
End: 2024-09-24
Payer: COMMERCIAL

## 2024-09-24 ENCOUNTER — HOSPITAL ENCOUNTER (EMERGENCY)
Facility: HOSPITAL | Age: 24
Discharge: HOME | End: 2024-09-24
Attending: EMERGENCY MEDICINE
Payer: COMMERCIAL

## 2024-09-24 VITALS
OXYGEN SATURATION: 99 % | DIASTOLIC BLOOD PRESSURE: 80 MMHG | RESPIRATION RATE: 16 BRPM | HEART RATE: 66 BPM | SYSTOLIC BLOOD PRESSURE: 125 MMHG | BODY MASS INDEX: 27.58 KG/M2 | WEIGHT: 165.57 LBS | TEMPERATURE: 98.6 F | HEIGHT: 65 IN

## 2024-09-24 DIAGNOSIS — N93.8 DYSFUNCTIONAL UTERINE BLEEDING: ICD-10-CM

## 2024-09-24 DIAGNOSIS — N93.9 VAGINAL BLEEDING: Primary | ICD-10-CM

## 2024-09-24 LAB
ALBUMIN SERPL-MCNC: 3.7 G/DL (ref 3.5–5)
ALBUMIN/GLOB SERPL: 1.1 (ref 1.1–2.2)
ALP SERPL-CCNC: 82 U/L (ref 45–117)
ALT SERPL-CCNC: 42 U/L (ref 12–78)
AMORPH CRY URNS QL MICRO: ABNORMAL
ANION GAP SERPL CALC-SCNC: 5 MMOL/L (ref 2–12)
APPEARANCE UR: ABNORMAL
AST SERPL-CCNC: 24 U/L (ref 15–37)
BACTERIA URNS QL MICRO: ABNORMAL /HPF
BASOPHILS # BLD: 0 K/UL (ref 0–0.1)
BASOPHILS NFR BLD: 0 % (ref 0–1)
BILIRUB SERPL-MCNC: 0.3 MG/DL (ref 0.2–1)
BILIRUB UR QL: NEGATIVE
BUN SERPL-MCNC: 14 MG/DL (ref 6–20)
BUN/CREAT SERPL: 19 (ref 12–20)
CALCIUM SERPL-MCNC: 9 MG/DL (ref 8.5–10.1)
CHLORIDE SERPL-SCNC: 110 MMOL/L (ref 97–108)
CLUE CELLS VAG QL WET PREP: NORMAL
CO2 SERPL-SCNC: 25 MMOL/L (ref 21–32)
COLOR UR: ABNORMAL
COMMENT:: NORMAL
CREAT SERPL-MCNC: 0.75 MG/DL (ref 0.55–1.02)
DIFFERENTIAL METHOD BLD: NORMAL
EOSINOPHIL # BLD: 0.3 K/UL (ref 0–0.4)
EOSINOPHIL NFR BLD: 4 % (ref 0–7)
EPITH CASTS URNS QL MICRO: ABNORMAL /LPF
ERYTHROCYTE [DISTWIDTH] IN BLOOD BY AUTOMATED COUNT: 14.5 % (ref 11.5–14.5)
GLOBULIN SER CALC-MCNC: 3.5 G/DL (ref 2–4)
GLUCOSE SERPL-MCNC: 103 MG/DL (ref 65–100)
GLUCOSE UR STRIP.AUTO-MCNC: NEGATIVE MG/DL
HCG UR QL: NEGATIVE
HCT VFR BLD AUTO: 37.2 % (ref 35–47)
HGB BLD-MCNC: 12.1 G/DL (ref 11.5–16)
HGB UR QL STRIP: ABNORMAL
IMM GRANULOCYTES # BLD AUTO: 0 K/UL (ref 0–0.04)
IMM GRANULOCYTES NFR BLD AUTO: 0 % (ref 0–0.5)
KETONES UR QL STRIP.AUTO: NEGATIVE MG/DL
LEUKOCYTE ESTERASE UR QL STRIP.AUTO: ABNORMAL
LIPASE SERPL-CCNC: 42 U/L (ref 13–75)
LYMPHOCYTES # BLD: 2.3 K/UL (ref 0.8–3.5)
LYMPHOCYTES NFR BLD: 32 % (ref 12–49)
MCH RBC QN AUTO: 26.5 PG (ref 26–34)
MCHC RBC AUTO-ENTMCNC: 32.5 G/DL (ref 30–36.5)
MCV RBC AUTO: 81.6 FL (ref 80–99)
MONOCYTES # BLD: 0.4 K/UL (ref 0–1)
MONOCYTES NFR BLD: 6 % (ref 5–13)
NEUTS SEG # BLD: 4.2 K/UL (ref 1.8–8)
NEUTS SEG NFR BLD: 58 % (ref 32–75)
NITRITE UR QL STRIP.AUTO: NEGATIVE
NRBC # BLD: 0 K/UL (ref 0–0.01)
NRBC BLD-RTO: 0 PER 100 WBC
PH UR STRIP: 8.5 (ref 5–8)
PLATELET # BLD AUTO: 278 K/UL (ref 150–400)
PMV BLD AUTO: 10.4 FL (ref 8.9–12.9)
POTASSIUM SERPL-SCNC: 3.6 MMOL/L (ref 3.5–5.1)
PROT SERPL-MCNC: 7.2 G/DL (ref 6.4–8.2)
PROT UR STRIP-MCNC: ABNORMAL MG/DL
RBC # BLD AUTO: 4.56 M/UL (ref 3.8–5.2)
RBC #/AREA URNS HPF: ABNORMAL /HPF (ref 0–5)
SODIUM SERPL-SCNC: 140 MMOL/L (ref 136–145)
SP GR UR REFRACTOMETRY: 1.02 (ref 1–1.03)
SPECIMEN HOLD: NORMAL
SPECIMEN HOLD: NORMAL
T VAGINALIS VAG QL WET PREP: NORMAL
UROBILINOGEN UR QL STRIP.AUTO: 1 EU/DL (ref 0.2–1)
WBC # BLD AUTO: 7.3 K/UL (ref 3.6–11)
WBC URNS QL MICRO: ABNORMAL /HPF (ref 0–4)
YEAST URNS QL MICRO: PRESENT
YEAST: NORMAL

## 2024-09-24 PROCEDURE — 76830 TRANSVAGINAL US NON-OB: CPT

## 2024-09-24 PROCEDURE — 81025 URINE PREGNANCY TEST: CPT

## 2024-09-24 PROCEDURE — 81001 URINALYSIS AUTO W/SCOPE: CPT

## 2024-09-24 PROCEDURE — 83690 ASSAY OF LIPASE: CPT

## 2024-09-24 PROCEDURE — 6370000000 HC RX 637 (ALT 250 FOR IP): Performed by: STUDENT IN AN ORGANIZED HEALTH CARE EDUCATION/TRAINING PROGRAM

## 2024-09-24 PROCEDURE — 85025 COMPLETE CBC W/AUTO DIFF WBC: CPT

## 2024-09-24 PROCEDURE — 99284 EMERGENCY DEPT VISIT MOD MDM: CPT

## 2024-09-24 PROCEDURE — 87210 SMEAR WET MOUNT SALINE/INK: CPT

## 2024-09-24 PROCEDURE — 80053 COMPREHEN METABOLIC PANEL: CPT

## 2024-09-24 PROCEDURE — 76856 US EXAM PELVIC COMPLETE: CPT

## 2024-09-24 PROCEDURE — 36415 COLL VENOUS BLD VENIPUNCTURE: CPT

## 2024-09-24 RX ORDER — FLUCONAZOLE 100 MG/1
150 TABLET ORAL ONCE
Status: COMPLETED | OUTPATIENT
Start: 2024-09-24 | End: 2024-09-24

## 2024-09-24 RX ADMIN — FLUCONAZOLE 150 MG: 100 TABLET ORAL at 19:40

## 2024-09-24 ASSESSMENT — PAIN - FUNCTIONAL ASSESSMENT: PAIN_FUNCTIONAL_ASSESSMENT: NONE - DENIES PAIN

## 2024-09-24 ASSESSMENT — ENCOUNTER SYMPTOMS
SORE THROAT: 0
EYE PAIN: 0
NAUSEA: 0
SHORTNESS OF BREATH: 0
DIARRHEA: 0
COUGH: 0
VOMITING: 0
ABDOMINAL PAIN: 0

## 2024-09-24 ASSESSMENT — PAIN SCALES - GENERAL: PAINLEVEL_OUTOF10: 0
